# Patient Record
Sex: MALE | Race: WHITE | NOT HISPANIC OR LATINO | URBAN - METROPOLITAN AREA
[De-identification: names, ages, dates, MRNs, and addresses within clinical notes are randomized per-mention and may not be internally consistent; named-entity substitution may affect disease eponyms.]

---

## 2021-01-11 ENCOUNTER — IMMUNIZATIONS (OUTPATIENT)
Dept: FAMILY MEDICINE CLINIC | Facility: HOSPITAL | Age: 51
End: 2021-01-11

## 2021-01-11 DIAGNOSIS — Z23 ENCOUNTER FOR IMMUNIZATION: ICD-10-CM

## 2021-01-11 PROCEDURE — 91301 SARS-COV-2 / COVID-19 MRNA VACCINE (MODERNA) 100 MCG: CPT

## 2021-01-11 PROCEDURE — 0011A SARS-COV-2 / COVID-19 MRNA VACCINE (MODERNA) 100 MCG: CPT

## 2021-02-08 ENCOUNTER — IMMUNIZATIONS (OUTPATIENT)
Dept: FAMILY MEDICINE CLINIC | Facility: HOSPITAL | Age: 51
End: 2021-02-08

## 2021-02-08 DIAGNOSIS — Z23 ENCOUNTER FOR IMMUNIZATION: Primary | ICD-10-CM

## 2021-02-08 PROCEDURE — 91301 SARS-COV-2 / COVID-19 MRNA VACCINE (MODERNA) 100 MCG: CPT

## 2021-02-08 PROCEDURE — 0012A SARS-COV-2 / COVID-19 MRNA VACCINE (MODERNA) 100 MCG: CPT

## 2024-11-21 ENCOUNTER — OFFICE VISIT (OUTPATIENT)
Dept: CARDIOLOGY CLINIC | Facility: CLINIC | Age: 54
End: 2024-11-21
Payer: COMMERCIAL

## 2024-11-21 VITALS
TEMPERATURE: 98.5 F | OXYGEN SATURATION: 98 % | BODY MASS INDEX: 29.43 KG/M2 | HEART RATE: 82 BPM | DIASTOLIC BLOOD PRESSURE: 80 MMHG | HEIGHT: 71 IN | WEIGHT: 210.2 LBS | SYSTOLIC BLOOD PRESSURE: 124 MMHG

## 2024-11-21 DIAGNOSIS — I48.0 PAF (PAROXYSMAL ATRIAL FIBRILLATION) (HCC): Primary | ICD-10-CM

## 2024-11-21 DIAGNOSIS — R06.09 DYSPNEA ON EXERTION: ICD-10-CM

## 2024-11-21 DIAGNOSIS — E78.00 PURE HYPERCHOLESTEROLEMIA: ICD-10-CM

## 2024-11-21 PROCEDURE — 99205 OFFICE O/P NEW HI 60 MIN: CPT | Performed by: INTERNAL MEDICINE

## 2024-11-21 PROCEDURE — 93000 ELECTROCARDIOGRAM COMPLETE: CPT | Performed by: INTERNAL MEDICINE

## 2024-11-21 NOTE — ASSESSMENT & PLAN NOTE
Recent decline in functional capacity and exertional dyspnea while exercising especially while running.  Most likely related to atrial fibrillation.  Plan for echocardiogram, Holter monitor and calcium scoring.

## 2024-11-21 NOTE — ASSESSMENT & PLAN NOTE
Newly diagnosed atrial fibrillation noted today on EKG.  He has had symptoms of exertional dyspnea for the last 6 months to 1 year.  His functional capacity and ability to exercise has gone down over the last 6 months.  He can still do strenuous isometric exercises but not so much running.  He denies palpitations.  He has had no previous cardiac evaluation.  EKG today shows atrial fibrillation with controlled ventricular response.  His [id number] score is 0.  Discussed pathophysiology of atrial fibrillation.  Reviewed stroke risk.  No current indication for anticoagulation therapy.  Plan Holter monitor to assess A-fib burden and rate control with exercise.  2D echo planned to evaluate for underlying structural heart disease or valvular heart disease.  Coronary calcium score requested for risk stratification given family history of unspecified heart disease and personal history of dyslipidemia.  After review of the above test, EP referral advised for consideration of pulmonary vein isolation for definitive treatment of atrial fibrillation.  He is not symptomatic at rest, and his rate is controlled currently, therefore I would defer cardioversion or antiarrhythmic therapy for now.  Discussed lifestyle modification including weight management, avoidance of alcohol intake and continued aerobic exercise as tolerated.  Orders:    POCT ECG    Echo complete w/ contrast if indicated; Future    CT coronary calcium score; Future    Holter monitor; Future    Ambulatory referral to Cardiac Electrophysiology; Future

## 2024-11-21 NOTE — PATIENT INSTRUCTIONS
Echocardiogram planned to evaluate heart function and rule out significant valvular heart disease.  48 hour holter.  EP referral.  Cardiac CT Calcium Score:  A CT calcium score exam, also known as a coronary calcium scan, is a quick, convenient and noninvasive way of evaluating the amount of calcified (hard) plaque in your heart vessels. The level of calcium equates to the extent of plaque build-up in your arteries. Plaque in the arteries can cause heart attacks.    The radiologist reads the images and sends your cardiologist a report with a calcium score. Patients with higher scores have a greater risk for a heart attack, heart disease or stroke. Knowing your score can help us decide on blood pressure and cholesterol goals that will minimize your risk as much as possible.    The American College of Cardiology found that Coronary artery calcification (CAC) is an excellent cardiovascular disease risk marker and can help guide the decision to use cholesterol reducing medications such as statins. A negative calcium score may reduce the need for statins in otherwise eligible patients. Knowing your score could save your life.    The exam takes less than 10 minutes, is painless and does not require any IV or oral contrast. The exam is typically not covered by insurance. The fee at [facility] radiology locations is $99.      Atrial Fibrillation management:  -Continue current cardiac medications.  -Maintain ideal body weight (weight loss helps lower A fib risk if you are overweight).  -Regular walking and increased physical activity is beneficial.  -Eliminate alcohol intake.  -Avoid smoking or recreational drug use.  -Use CPAP if you have sleep apnea.  -Optimal control of  blood pressure and blood sugars.            A-fib (Atrial Fibrillation) or Atrial flutter  WHAT YOU NEED TO KNOW:   A-fib may come and go, or it may be a long-term condition. A-fib can cause blood clots, stroke, or heart failure. These conditions may  become life-threatening. It is important to treat and manage a-fib to help prevent a blood clot, stroke, or heart failure.            Medicines:  You may need any of the following:  Heart medicines  help control your heart rate or rhythm. You may need more than one medicine to treat your symptoms.     Blood thinners: (Eliquis, Xarelto, Pradaxa or Warfarin/coumadin).help prevent blood clots.  Clots can cause strokes, heart attacks, and death. The following are general safety guidelines to follow while you are taking a blood thinner:    Watch for bleeding and bruising while you take blood thinners. Watch for bleeding from your gums or nose. Watch for blood in your urine and bowel movements. Use a soft washcloth on your skin, and a soft toothbrush to brush your teeth. This can keep your skin and gums from bleeding. If you shave, use an electric shaver. Do not play contact sports.     Tell your dentist and other healthcare providers that you take a blood thinner. Wear a bracelet or necklace that says you take this medicine.     Do not start or stop any other medicines unless your healthcare provider tells you to. Many medicines cannot be used with blood thinners.    Take your blood thinner exactly as prescribed by your healthcare provider. Do not skip does or take less than prescribed. Tell your provider right away if you forget to take your blood thinner, or if you take too much.    The following are things you should be aware of if you take warfarin:     Foods and medicines can affect the amount of warfarin in your blood. Do not make major changes to your diet while you take warfarin. Warfarin works best when you eat about the same amount of vitamin K every day. Vitamin K is found in green leafy vegetables and certain other foods. Ask for more information about what to eat when you are taking warfarin.    You will need to see your healthcare provider for follow-up visits when you are on warfarin. You will need regular  blood tests. These tests are used to decide how much medicine you need.      Take your medicine as directed.  Keep a list of the medicines, vitamins, and herbs you take. Include the amounts, and when and why you take them. Bring the list or the pill bottles to follow-up visits. Carry your medicine list with you in case of an emergency.    Manage A-fib:   Know your target heart rate ().  Learn how to check your pulse and monitor your heart rate. Count your pulse for one minute to get an accurate reading.    Avoid alcohol.  Alcohol can make a-fib hard to manage.     Do not smoke.  Nicotine can cause heart damage and make it more difficult to manage your a-fib. Do not use e-cigarettes or smokeless tobacco in place of cigarettes or to help you quit. They still contain nicotine. Ask your healthcare provider for information if you currently smoke and need help quitting.    Eat heart-healthy foods.  Heart healthy foods will help keep your cholesterol low. These include fruits, vegetables, whole-grain breads, low-fat dairy products, beans, lean meats, and fish. Replace butter and margarine with heart-healthy oils such as olive oil and canola oil.     Maintain a healthy weight.  Ask your healthcare provider how much you should weigh. Ask him or her to help you create a safe weight loss plan if you are overweight. Even a small goal of a 10% weight loss can improve your heart health.     Get regular physical activity.  Physical activity helps improve your heart health. Get at least 150 minutes of moderate aerobic physical activity each week. Your healthcare provider can help you create an activity plan.         Manage other health conditions.  This includes high blood pressure or cholesterol, sleep apnea, diabetes, coronary disease, heart failure and kidney disease. Take medicine as directed and follow your treatment plan.      The above information is an  only. It is not intended as medical advice for  individual conditions or treatments. Talk to your doctor, nurse or pharmacist before following any medical regimen to see if it is safe and effective for you.

## 2024-11-21 NOTE — ASSESSMENT & PLAN NOTE
LDL only mildly elevated.  Does not meet criteria for statin therapy.  Calcium score is requested.  Orders:    CT coronary calcium score; Future

## 2024-11-21 NOTE — PROGRESS NOTES
[facility]  [place] [facility] [address]  Phone#  [phone]  Fax#  [phone]  [facility] Cardiology Office Consultation             NAME: Brandon Mckeon  AGE: 54 y.o. SEX: male   : 1970   MRN: [id number]    DATE: 2024  TIME: 4:01 PM    Cardiology Problem list:  Atrial fibrillation:   Dyspnea on exertion  Family history of heart disease: Father, details unknown   Hypercholesterolemia      Assessment & Plan  PAF (paroxysmal atrial fibrillation) (HCC)  Newly diagnosed atrial fibrillation noted today on EKG.  He has had symptoms of exertional dyspnea for the last 6 months to 1 year.  His functional capacity and ability to exercise has gone down over the last 6 months.  He can still do strenuous isometric exercises but not so much running.  He denies palpitations.  He has had no previous cardiac evaluation.  EKG today shows atrial fibrillation with controlled ventricular response.  His [id number]-FPCx score is 0.  Discussed pathophysiology of atrial fibrillation.  Reviewed stroke risk.  No current indication for anticoagulation therapy.  Plan Holter monitor to assess A-fib burden and rate control with exercise.  2D echo planned to evaluate for underlying structural heart disease or valvular heart disease.  Coronary calcium score requested for risk stratification given family history of unspecified heart disease and personal history of dyslipidemia.  After review of the above test, EP referral advised for consideration of pulmonary vein isolation for definitive treatment of atrial fibrillation.  He is not symptomatic at rest, and his rate is controlled currently, therefore I would defer cardioversion or antiarrhythmic therapy for now.  Discussed lifestyle modification including weight management, avoidance of alcohol intake and continued aerobic exercise as tolerated.  Orders:    POCT ECG    Echo complete w/ contrast if indicated; Future    CT coronary calcium  score; Future    Holter monitor; Future    Ambulatory referral to Cardiac Electrophysiology; Future    Pure hypercholesterolemia  LDL only mildly elevated.  Does not meet criteria for statin therapy.  Calcium score is requested.  Orders:    CT coronary calcium score; Future    Dyspnea on exertion  Recent decline in functional capacity and exertional dyspnea while exercising especially while running.  Most likely related to atrial fibrillation.  Plan for echocardiogram, Holter monitor and calcium scoring.              HPI:    Brandon Mckeon is a 54 y.o.-year-old male who presents to the cardiology clinic for initial consultation.    He is here for initial cardiac evaluation in view of family history of heart disease.  His LDL in  was 122.  LDL in  was 124, HDL 50 and triglycerides 102.  At that time LFTs were normal.  Renal function also was normal.  He has had no previous cardiac evaluation.  He is concerned about his CAD risk.  He has a history of very mild dyslipidemia.    He reports he was apparently well till about 6 to 9 months ago and noticed some decline in his functional capacity.  About 6 months ago he was trying to do his regular 8 mile run and suddenly he got short of breath very early into the run.  He could not complete his run.  Over the next few months he again continued to struggle with his running and cut down his exercise program.  He has continued to do isometric exercises in the gym and can lift weights and do strength training without much limitation though he feels he gets tired much easier.  He reports occasional sensation of lightheadedness.  He had 1 very severe episode a few weeks ago.  No syncope reported.  He denies palpitations.  His father had a very unhealthy lifestyle including drug use and  of some complications of heart disease the details of which are unknown.  His mother is alive.  No other family members have atrial fibrillation.    Today's EKG shows atrial  fibrillation with controlled ventricular response.  He currently does not have active primary care provider.    He has no prior history of TIA or stroke.  He is not a diabetic or smoker.  He drinks alcohol only occasionally.      Past history, family history, social history, current medications, vital signs, recent lab and imaging studies and  prior cardiology studies reviewed independently on this visit.    No Known Allergies    Current Outpatient Medications:     Bromelains (BROMELAIN PO), Take by mouth, Disp: , Rfl:     magnesium gluconate (MAGONATE) 500 mg tablet, Take 200 mg by mouth in the morning, Disp: , Rfl:     NATTOKINASE PO, Take by mouth, Disp: , Rfl:     TURMERIC CURCUMIN PO, Take by mouth, Disp: , Rfl:     Brompheniramine-Pseudoeph (BROMALINE PO), Take by mouth (Patient not taking: Reported on 11/21/2024), Disp: , Rfl:     Past Medical History:   Diagnosis Date    Hyperlipidemia      Past Surgical History:   Procedure Laterality Date    FINGER SURGERY       Family History   Problem Relation Age of Onset    No Known Problems Mother     Heart disease Father      Social History   reports that he has never smoked. He has never used smokeless tobacco. He reports current alcohol use. He reports that he does not use drugs. Non smoker  Rare alcohol use    Review of Systems   Constitutional:  Positive for fatigue.   HENT: Negative.     Eyes: Negative.    Respiratory:  Positive for shortness of breath.    Cardiovascular:  Negative for chest pain, palpitations and leg swelling.   Gastrointestinal: Negative.    Endocrine: Negative.    Musculoskeletal: Negative.    Skin: Negative.    Neurological:  Positive for light-headedness. Negative for syncope.   Hematological: Negative.    Psychiatric/Behavioral: Negative.     All other systems reviewed and are negative.      Objective:     Vitals:    11/21/24 1522   BP: 124/80   Pulse: 82   Temp: 98.5 °F (36.9 °C)   SpO2: 98%     Physical Exam  Vitals reviewed.  "  Constitutional:       General: He is not in acute distress.  HENT:      Head: Normocephalic.   Neck:      Vascular: No carotid bruit.   Cardiovascular:      Rate and Rhythm: Normal rate. Rhythm irregularly irregular.      Heart sounds: S1 normal and S2 normal. Murmur heard.      Systolic murmur is present with a grade of 2/6.   Pulmonary:      Breath sounds: No wheezing or rales.   Musculoskeletal:         General: No swelling.      Right lower leg: No edema.      Left lower leg: No edema.   Skin:     General: Skin is warm.   Neurological:      General: No focal deficit present.      Mental Status: He is alert.   Psychiatric:         Mood and Affect: Mood normal.         Pertinent Laboratory/Diagnostic Studies:    Laboratory studies reviewed personally by Usama Montgomery MD    BMP:   Lab Results   Component Value Date    SODIUM 141 11/17/2023    K 4.5 11/17/2023     11/17/2023    CO2 29 11/17/2023    BUN 15 11/17/2023    CREATININE 1.05 11/17/2023    GLUC 91 11/17/2023    CALCIUM 9.4 11/17/2023       Lipid Profile:   Lab Results   Component Value Date    HDL 56 08/15/2014     Lab Results   Component Value Date    LDLCALC 122 (H) 08/15/2014     Lab Results   Component Value Date    TRIG 76 08/15/2014      Other labs:  No results found for: \"FPG9SQNDEROD\", \"TSH\"  Lab Results   Component Value Date    ALT 32 11/17/2023    AST 22 11/17/2023     No results found for: \"HGBA1C\"    Imaging Studies:     Pertinent cardiac studies and imaging studies were personally reviewed on this visit and results summarized.    I have spent a total time of 62  minutes in caring for this patient on the day of the visit/encounter including reviewing Diagnostic results, Instructions for management, Patient education, Risk factor reductions, Impressions, Documenting in the medical record, Reviewing / ordering tests, medicine, procedures  , and Obtaining or reviewing history  .    Portions of the record may have been created with voice " "recognition software.  Occasional wrong word or \"sound alike\" substitutions may have occurred due to the inherent limitations of voice recognition software.  Read the chart carefully and recognize, using context, where substitutions have occurred. Please reach out to me directly for any clarifications.  "

## 2024-12-03 ENCOUNTER — HOSPITAL ENCOUNTER (OUTPATIENT)
Dept: NON INVASIVE DIAGNOSTICS | Facility: HOSPITAL | Age: 54
Discharge: HOME | End: 2024-12-03
Attending: INTERNAL MEDICINE
Payer: COMMERCIAL

## 2024-12-03 DIAGNOSIS — I48.0 PAF (PAROXYSMAL ATRIAL FIBRILLATION) (HCC): ICD-10-CM

## 2024-12-03 PROCEDURE — 93225 XTRNL ECG REC<48 HRS REC: CPT

## 2024-12-03 PROCEDURE — 93226 XTRNL ECG REC<48 HR SCAN A/R: CPT

## 2024-12-06 ENCOUNTER — RESULTS FOLLOW-UP (OUTPATIENT)
Dept: CARDIOLOGY CLINIC | Facility: CLINIC | Age: 54
End: 2024-12-06

## 2024-12-06 PROCEDURE — 93227 XTRNL ECG REC<48 HR R&I: CPT | Performed by: INTERNAL MEDICINE

## 2024-12-06 NOTE — RESULT ENCOUNTER NOTE
Cardiac monitor shows that overall heart rhythm is irregular related to atrial fibrillation.  Average heart rate is : 82. This signifies good control.  However the heart rate did vary from  bpm which is typically seen with atrial fibrillation.  You already have a scheduled follow-up with Dr. Brown next week.  Please keep your appointment to discuss ablation options.    Please call patient with test results.

## 2024-12-09 NOTE — PROGRESS NOTES
Consultation - Electrophysiology-Cardiology (EP)   Brandon Mckeon 54 y.o. male MRN: [id number]  Unit/Bed#:  Encounter: [id number]      1. PAF (paroxysmal atrial fibrillation) (HCC)  Ambulatory referral to Cardiac Electrophysiology    POCT ECG      2. Dyspnea on exertion        3. Mixed hyperlipidemia                Consults  Physician Requesting Consult: Usama Montgomery MD   Reason for Consult / Principal Problem: PAF & discuss ablation      Summary of my recommendation for the patient  Patient has history of documented A-fib since November 2024  He is extremely athletic-does [employer], runs 5 miles with his children, significant amount of calisthenics  Noted a sudden decline in activity for at least 6 months to 12 months    Heart rate is in the 70s, [id number] score is 0  Does not have any of the usual risk factors which predispose to A-fib    He is going to be best served with early ablation  Pulsed field, comprehensive PVI and posterior wall    Risks and benefits discussed with the patient  There is a small risk of bleeding of the groin, bleeding around the heart, heart attack, stroke, injury to surrounding structures    Please make sure that the transthoracic echo is done/major abnormalities ruled out before even setting up for the procedures  CT pulmonary vein  CARLOS ENRIQUE prior to the procedure  Start Eliquis at least 1 to 2 weeks before the procedure -hold Eliquis the morning of the procedure and to continue it for 3 months postprocedure  NavX              Clinical conditions  Atrial fibrillation-persistent  Exertional dyspnea for 6 months to a year  [id number] score is 0  Mixed hyperlipidemia            Assessment & Plan     Atrial fibrillation-persistent  Exertional dyspnea for 6 months to a year  [id number] score is 0  Mixed hyperlipidemia    Patient has history of documented A-fib since November 2024  He is extremely athletic-does [employer], runs 5 miles with his children, significant amount of  alem  Noted a sudden decline in activity for at least 6 months to 12 months    Heart rate is in the 70s, [id number] score is 0  Does not have any of the usual risk factors which predispose to A-fib    He is going to be best served with early ablation  Pulsed field, comprehensive PVI and posterior wall    Risks and benefits discussed with the patient  There is a small risk of bleeding of the groin, bleeding around the heart, heart attack, stroke, injury to surrounding structures    Please make sure that the transthoracic echo is done/major abnormalities ruled out before even setting up for the procedures  CT pulmonary vein  CARLOS ENRIQUE prior to the procedure  Start Eliquis at least 1 to 2 weeks before the procedure -hold Eliquis the morning of the procedure and to continue it for 3 months postprocedure  NavX                History of Present Illness   HPI: Brandon Mckeon is a 54 y.o. year old male has been referred to me by Dr Montgomery  For evaluation and management of AF    The patient has significant medical illnesses which include  Persistent A fib   Exertional dyspnea     Patient has history of documented A-fib since 2024  He is extremely athletic-does [employer], runs 5 miles with his children, significant amount of calisthenics  Noted a sudden decline in activity for at least 6 months to 12 months    He is not complaining of anginal-like chest pain, orthopnea, PND, leg swelling  he is not complaining of significant palpitation, presyncope or syncope  He does feel exertional intolerance    He does not abuse tobacco alcohol or energy drinks    His father has history of drug abuse and  at the age of 52    Historical Information   Past Medical History:   Diagnosis Date    Hyperlipidemia      Past Surgical History:   Procedure Laterality Date    FINGER SURGERY       Social History     Substance and Sexual Activity   Alcohol Use Yes     Social History     Substance and Sexual Activity   Drug Use Never  "    Social History     Tobacco Use   Smoking Status Never   Smokeless Tobacco Never     Social History     Socioeconomic History    Marital status: /Civil Union     Spouse name: Not on file    Number of children: Not on file    Years of education: Not on file    Highest education level: Not on file   Occupational History    Not on file   Tobacco Use    Smoking status: Never    Smokeless tobacco: Never   Vaping Use    Vaping status: Never Used   Substance and Sexual Activity    Alcohol use: Yes    Drug use: Never    Sexual activity: Not on file   Other Topics Concern    Not on file   Social History Narrative    Not on file     Social Drivers of Health     Financial Resource Strain: Not on file   Food Insecurity: Not on file   Transportation Needs: Not on file   Physical Activity: Not on file   Stress: Not on file   Social Connections: Not on file   Intimate Partner Violence: Not on file   Housing Stability: Not on file     .  Family History:  Family History   Problem Relation Age of Onset    No Known Problems Mother     Heart disease Father          Meds/Allergies    No current facility-administered medications for this visit.     Not in a hospital admission.    No Known Allergies        Objective   Vitals: Visit Vitals  /86 (BP Location: Right arm, Patient Position: Sitting, Cuff Size: Large)   Pulse 73   Ht 5' 11\" (1.803 m)   Wt 95.5 kg (210 lb 8 oz)   BMI 29.36 kg/m²   Smoking Status Never   BSA 2.16 m²      Vitals:    12/11/24 1326   Weight: 95.5 kg (210 lb 8 oz)   [unfilled]    Invasive Devices       None                     ROS  Review of Systems   All other systems reviewed and are negative.  As described in my history of present illness         PHYSICAL EXAM  Physical Exam  Vitals reviewed.   Constitutional:       General: He is not in acute distress.     Appearance: Normal appearance. He is normal weight. He is not ill-appearing.   HENT:      Head: Normocephalic and atraumatic.      Right Ear: " "External ear normal.      Left Ear: External ear normal.      Nose: Nose normal.      Mouth/Throat:      Pharynx: Oropharynx is clear.   Eyes:      General: No scleral icterus.     Extraocular Movements: Extraocular movements intact.      Conjunctiva/sclera: Conjunctivae normal.      Pupils: Pupils are equal, round, and reactive to light.   Cardiovascular:      Rate and Rhythm: Normal rate. Rhythm irregular.      Heart sounds: Normal heart sounds. No murmur heard.     No gallop.   Pulmonary:      Effort: No respiratory distress.      Breath sounds: Normal breath sounds. No wheezing.   Abdominal:      General: Bowel sounds are normal. There is no distension.      Palpations: Abdomen is soft.      Tenderness: There is no abdominal tenderness. There is no guarding.   Musculoskeletal:         General: No swelling or deformity.      Cervical back: Neck supple. No rigidity.   Skin:     Coloration: Skin is not jaundiced.      Findings: No bruising or lesion.   Neurological:      Mental Status: He is alert and oriented to person, place, and time. Mental status is at baseline.      Motor: No weakness.   Psychiatric:         Mood and Affect: Mood normal.         Behavior: Behavior normal.         Thought Content: Thought content normal.         Judgment: Judgment normal.               LAB RESULTS:    CBC:  No results found for: \"WBC\", \"HGB\", \"HCT\", \"MCV\", \"PLT\", \"ADJUSTEDWBC\", \"RBC\", \"MCH\", \"MCHC\", \"RDW\", \"MPV\", \"NRBC\"    CMP:  Sodium   Date Value Ref Range Status   08/15/2014 137 136 - 145 mmol/L Final     Potassium   Date Value Ref Range Status   11/17/2023 4.5 3.5 - 5.2 mmol/L Final     Chloride   Date Value Ref Range Status   11/17/2023 104 100 - 109 mmol/L Final     Carbon Dioxide   Date Value Ref Range Status   11/17/2023 29 21 - 31 mmol/L Final     Anion Gap   Date Value Ref Range Status   08/15/2014 3 (L) 4 - 13 mmol/L Final     BUN   Date Value Ref Range Status   11/17/2023 15 7 - 28 mg/dL Final     Creatinine   Date " "Value Ref Range Status   11/17/2023 1.05 0.53 - 1.30 mg/dL Final     Glucose   Date Value Ref Range Status   08/15/2014 98 65 - 140 mg/dL Final     Comment:     If patient is fasting, the ADA then defines impaired fasting glucose as  >100 mg/dl and diabetes as  >or equal to 126 mg/dl.       Calcium   Date Value Ref Range Status   11/17/2023 9.4 8.5 - 10.1 mg/dL Final     AST   Date Value Ref Range Status   11/17/2023 22 <41 U/L Final     ALT   Date Value Ref Range Status   11/17/2023 32 <56 U/L Final     Alkaline Phosphatase   Date Value Ref Range Status   11/17/2023 50 35 - 120 U/L Final     Total Protein   Date Value Ref Range Status   08/15/2014 7.4 6.4 - 8.2 g/dL Final     Total Bilirubin   Date Value Ref Range Status   08/15/2014 0.81 0.20 - 1.00 mg/dL Final     GFR, Calculated   Date Value Ref Range Status   03/05/2020 91 >60 mL/min/1.73m2 Final     Comment:     mL/min per 1.73 square meters                                            Normal Function or Mild Renal    Disease (if clinically at risk):  >or=60  Moderately Decreased:                30-59  Severely Decreased:                  15-29  Renal Failure:                         <15                                            -American GFR: multiply reported GFR by 1.16    Please note that the eGFR is based on the CKD-EPI calculation, and is not intended to be used for drug dosing.                                            Note: Calculated GFR may not be an accurate indicator of renal function if the patient's renal function is not in a steady state.    Ordering Provider: PIETRO MORENO     eGFRcr   Date Value Ref Range Status   11/17/2023 84 >59 Final        Magnesium:   No results found for: \"MG\", \"MAGNESIUM\"     A1C:  No results found for: \"HGBA1C\"     TSH:  No results found for: \"TSH\", \"[id number]\", \"TSHREFLEX\"     PT/INR:  No results found for: \"PROTIME\", \"INR\"    Lipid Panel:  Triglycerides   Date Value Ref Range Status   08/15/2014 76 mg/dL " "Final     Comment:     TRIGLYCERIDE:       Normal                 <150 mg/dl       Borderline High       150-199 mg/dl       High                  200-499 mg/dl       Very High             >499 mg/dl  _______________________________________       HDL   Date Value Ref Range Status   08/15/2014 56 mg/dL Final     Comment:     HDL:       High       >59 mg/dl       Low        <41 mg/dl  ______________________________         Troponin:  No results found for: \"TROPONINI\"      Imaging:    [id number] - Atrial fibrillation         CARLOS ENRIQUE:  No results found for this or any previous visit.      Echocardiogram:  No results found for this or any previous visit.      Stress Test:   No results found for this or any previous visit.      Cardiac Catheterization:  No results found for this or any previous visit.      HOLTER MONITOR: 24 HOUR/48 HOUR MONITORS  Results for orders placed during the hospital encounter of 24    Holter monitor    Interpretation Summary  PT NAME: Brandon Mckeon  : 1970  AGE: 54 y.o.  GENDER: male  MRN: [id number]   PROCEDURE: Holter monitor        INDICATIONS: Palpitations      FINDINGS:    Holter monitoring revealed predominant atrial fibrillation with an average heart rate of 82 BPM, a minimum heart rate of 41 BPM, and a maximum heart rate of 214 BPM.    There were about 395 ventricular ectopic beats, mostly occurring as single PVC's with no evidence of ventricular tachycardia.    There was no evidence of supraventricular arrhythmias other than the patient's persistent atrial fibrillation noted throughout the study.  There is no evidence of atrial flutter.    There was no evidence of significant bradyarrhythmia or advanced heart block.  The longest R-R interval was 2.8 seconds.    The patient's symptom diary reported no symptoms.      AMB Extended Holter Monitor: Zio XT/AT or BioTel  No results found for this or any previous visit.        DEVICE CHECK:     No results found for this or any " previous visit.           Code Status: [unfilled]  Advance Directive and Living Will:      Power of :    POLST:      Counseling / Coordination of Care    Discussed persistent AF and need to proceed with ablation , risks and benefits     Lucien Borwn MD

## 2024-12-09 NOTE — RESULT ENCOUNTER NOTE
Called pt with holter monitor results. Pt understood. Informed pt to keep his appt with Dr. Brown in a couple days. Pt understood.

## 2024-12-11 ENCOUNTER — OFFICE VISIT (OUTPATIENT)
Dept: CARDIOLOGY CLINIC | Facility: CLINIC | Age: 54
End: 2024-12-11

## 2024-12-11 VITALS
SYSTOLIC BLOOD PRESSURE: 136 MMHG | WEIGHT: 210.5 LBS | DIASTOLIC BLOOD PRESSURE: 86 MMHG | HEART RATE: 73 BPM | HEIGHT: 71 IN | BODY MASS INDEX: 29.47 KG/M2

## 2024-12-11 DIAGNOSIS — I48.0 PAF (PAROXYSMAL ATRIAL FIBRILLATION) (HCC): Primary | ICD-10-CM

## 2024-12-11 DIAGNOSIS — R06.09 DYSPNEA ON EXERTION: ICD-10-CM

## 2024-12-11 DIAGNOSIS — E78.2 MIXED HYPERLIPIDEMIA: ICD-10-CM

## 2024-12-11 NOTE — LETTER
December 11, 2024     Usama Montgomery MD  [address] [place] [id number]    Patient: Brandon Mckeon   YOB: 1970   Date of Visit: 12/11/2024       Dear Dr. Montgomery:    Thank you for referring Brandon Mckeon to me for evaluation. Below are my notes for this consultation.    If you have questions, please do not hesitate to call me. I look forward to following your patient along with you.         Sincerely,        Lucien Brown MD        CC: Brandon Mike  CARDIOLOGY SURGERY COORDINATOR    Lucien Brown MD  12/11/2024  2:56 PM  Sign when Signing Visit   Consultation - Electrophysiology-Cardiology (EP)   Brandon Mckeon 54 y.o. male MRN: [id number]  Unit/Bed#:  Encounter: [id number]      1. PAF (paroxysmal atrial fibrillation) (HCC)  Ambulatory referral to Cardiac Electrophysiology    POCT ECG      2. Dyspnea on exertion        3. Mixed hyperlipidemia                Consults  Physician Requesting Consult: Usama Montgomery MD   Reason for Consult / Principal Problem: PAF & discuss ablation      Summary of my recommendation for the patient  Patient has history of documented A-fib since November 2024  He is extremely athletic-does [employer], runs 5 miles with his children, significant amount of calisthenics  Noted a sudden decline in activity for at least 6 months to 12 months    Heart rate is in the 70s, [id number]-[id number] score is 0  Does not have any of the usual risk factors which predispose to A-fib    He is going to be best served with early ablation  Pulsed field, comprehensive PVI and posterior wall    Risks and benefits discussed with the patient  There is a small risk of bleeding of the groin, bleeding around the heart, heart attack, stroke, injury to surrounding structures    Please make sure that the transthoracic echo is done/major abnormalities ruled out before even setting up for the procedures  CT pulmonary vein  CARLOS ENRIQUE prior to the procedure  Start Eliquis at least 1 to 2 weeks before the procedure -hold  Eliquis the morning of the procedure and to continue it for 3 months postprocedure  NavX              Clinical conditions  Atrial fibrillation-persistent  Exertional dyspnea for 6 months to a year  [id number] score is 0  Mixed hyperlipidemia            Assessment & Plan    Atrial fibrillation-persistent  Exertional dyspnea for 6 months to a year  [id number] score is 0  Mixed hyperlipidemia    Patient has history of documented A-fib since November 2024  He is extremely athletic-does [employer], runs 5 miles with his children, significant amount of calisthenics  Noted a sudden decline in activity for at least 6 months to 12 months    Heart rate is in the 70s, [id number] score is 0  Does not have any of the usual risk factors which predispose to A-fib    He is going to be best served with early ablation  Pulsed field, comprehensive PVI and posterior wall    Risks and benefits discussed with the patient  There is a small risk of bleeding of the groin, bleeding around the heart, heart attack, stroke, injury to surrounding structures    Please make sure that the transthoracic echo is done/major abnormalities ruled out before even setting up for the procedures  CT pulmonary vein  CARLOS ENRIQUE prior to the procedure  Start Eliquis at least 1 to 2 weeks before the procedure -hold Eliquis the morning of the procedure and to continue it for 3 months postprocedure  NavX                History of Present Illness  HPI: Brandon Mckeon is a 54 y.o. year old male has been referred to me by Dr Montgomery  For evaluation and management of AF    The patient has significant medical illnesses which include  Persistent A fib   Exertional dyspnea     Patient has history of documented A-fib since November 2024  He is extremely athletic-does [employer], runs 5 miles with his children, significant amount of calisthenics  Noted a sudden decline in activity for at least 6 months to 12 months    He is not complaining of anginal-like chest pain, orthopnea, PND,  "leg swelling  he is not complaining of significant palpitation, presyncope or syncope  He does feel exertional intolerance    He does not abuse tobacco alcohol or energy drinks    His father has history of drug abuse and  at the age of 52    Historical Information  Past Medical History:   Diagnosis Date   • Hyperlipidemia      Past Surgical History:   Procedure Laterality Date   • FINGER SURGERY       Social History     Substance and Sexual Activity   Alcohol Use Yes     Social History     Substance and Sexual Activity   Drug Use Never     Social History     Tobacco Use   Smoking Status Never   Smokeless Tobacco Never     Social History     Socioeconomic History   • Marital status: /Civil Union     Spouse name: Not on file   • Number of children: Not on file   • Years of education: Not on file   • Highest education level: Not on file   Occupational History   • Not on file   Tobacco Use   • Smoking status: Never   • Smokeless tobacco: Never   Vaping Use   • Vaping status: Never Used   Substance and Sexual Activity   • Alcohol use: Yes   • Drug use: Never   • Sexual activity: Not on file   Other Topics Concern   • Not on file   Social History Narrative   • Not on file     Social Drivers of Health     Financial Resource Strain: Not on file   Food Insecurity: Not on file   Transportation Needs: Not on file   Physical Activity: Not on file   Stress: Not on file   Social Connections: Not on file   Intimate Partner Violence: Not on file   Housing Stability: Not on file     .  Family History:  Family History   Problem Relation Age of Onset   • No Known Problems Mother    • Heart disease Father          Meds/Allergies   No current facility-administered medications for this visit.     Not in a hospital admission.    No Known Allergies        Objective  Vitals: Visit Vitals  /86 (BP Location: Right arm, Patient Position: Sitting, Cuff Size: Large)   Pulse 73   Ht 5' 11\" (1.803 m)   Wt 95.5 kg (210 lb 8 oz) " "  BMI 29.36 kg/m²   Smoking Status Never   BSA 2.16 m²      Vitals:    12/11/24 1326   Weight: 95.5 kg (210 lb 8 oz)   [unfilled]    Invasive Devices       None                     ROS  Review of Systems   All other systems reviewed and are negative.  As described in my history of present illness         PHYSICAL EXAM  Physical Exam  Vitals reviewed.   Constitutional:       General: He is not in acute distress.     Appearance: Normal appearance. He is normal weight. He is not ill-appearing.   HENT:      Head: Normocephalic and atraumatic.      Right Ear: External ear normal.      Left Ear: External ear normal.      Nose: Nose normal.      Mouth/Throat:      Pharynx: Oropharynx is clear.   Eyes:      General: No scleral icterus.     Extraocular Movements: Extraocular movements intact.      Conjunctiva/sclera: Conjunctivae normal.      Pupils: Pupils are equal, round, and reactive to light.   Cardiovascular:      Rate and Rhythm: Normal rate. Rhythm irregular.      Heart sounds: Normal heart sounds. No murmur heard.     No gallop.   Pulmonary:      Effort: No respiratory distress.      Breath sounds: Normal breath sounds. No wheezing.   Abdominal:      General: Bowel sounds are normal. There is no distension.      Palpations: Abdomen is soft.      Tenderness: There is no abdominal tenderness. There is no guarding.   Musculoskeletal:         General: No swelling or deformity.      Cervical back: Neck supple. No rigidity.   Skin:     Coloration: Skin is not jaundiced.      Findings: No bruising or lesion.   Neurological:      Mental Status: He is alert and oriented to person, place, and time. Mental status is at baseline.      Motor: No weakness.   Psychiatric:         Mood and Affect: Mood normal.         Behavior: Behavior normal.         Thought Content: Thought content normal.         Judgment: Judgment normal.               LAB RESULTS:    CBC:  No results found for: \"WBC\", \"HGB\", \"HCT\", \"MCV\", \"PLT\", \"ADJUSTEDWBC\", " "\"RBC\", \"MCH\", \"MCHC\", \"RDW\", \"MPV\", \"NRBC\"    CMP:  Sodium   Date Value Ref Range Status   08/15/2014 137 136 - 145 mmol/L Final     Potassium   Date Value Ref Range Status   11/17/2023 4.5 3.5 - 5.2 mmol/L Final     Chloride   Date Value Ref Range Status   11/17/2023 104 100 - 109 mmol/L Final     Carbon Dioxide   Date Value Ref Range Status   11/17/2023 29 21 - 31 mmol/L Final     Anion Gap   Date Value Ref Range Status   08/15/2014 3 (L) 4 - 13 mmol/L Final     BUN   Date Value Ref Range Status   11/17/2023 15 7 - 28 mg/dL Final     Creatinine   Date Value Ref Range Status   11/17/2023 1.05 0.53 - 1.30 mg/dL Final     Glucose   Date Value Ref Range Status   08/15/2014 98 65 - 140 mg/dL Final     Comment:     If patient is fasting, the ADA then defines impaired fasting glucose as  >100 mg/dl and diabetes as  >or equal to 126 mg/dl.       Calcium   Date Value Ref Range Status   11/17/2023 9.4 8.5 - 10.1 mg/dL Final     AST   Date Value Ref Range Status   11/17/2023 22 <41 U/L Final     ALT   Date Value Ref Range Status   11/17/2023 32 <56 U/L Final     Alkaline Phosphatase   Date Value Ref Range Status   11/17/2023 50 35 - 120 U/L Final     Total Protein   Date Value Ref Range Status   08/15/2014 7.4 6.4 - 8.2 g/dL Final     Total Bilirubin   Date Value Ref Range Status   08/15/2014 0.81 0.20 - 1.00 mg/dL Final     GFR, Calculated   Date Value Ref Range Status   03/05/2020 91 >60 mL/min/1.73m2 Final     Comment:     mL/min per 1.73 square meters                                            Normal Function or Mild Renal    Disease (if clinically at risk):  >or=60  Moderately Decreased:                30-59  Severely Decreased:                  15-29  Renal Failure:                         <15                                            -American GFR: multiply reported GFR by 1.16    Please note that the eGFR is based on the CKD-EPI calculation, and is not intended to be used for drug dosing.                      " "                      Note: Calculated GFR may not be an accurate indicator of renal function if the patient's renal function is not in a steady state.    Ordering Provider: PIETRO MORENO     eGFRcr   Date Value Ref Range Status   2023 84 >59 Final        Magnesium:   No results found for: \"MG\", \"MAGNESIUM\"     A1C:  No results found for: \"HGBA1C\"     TSH:  No results found for: \"TSH\", \"[id number]\", \"TSHREFLEX\"     PT/INR:  No results found for: \"PROTIME\", \"INR\"    Lipid Panel:  Triglycerides   Date Value Ref Range Status   08/15/2014 76 mg/dL Final     Comment:     TRIGLYCERIDE:       Normal                 <150 mg/dl       Borderline High       150-199 mg/dl       High                  200-499 mg/dl       Very High             >499 mg/dl  _______________________________________       HDL   Date Value Ref Range Status   08/15/2014 56 mg/dL Final     Comment:     HDL:       High       >59 mg/dl       Low        <41 mg/dl  ______________________________         Troponin:  No results found for: \"TROPONINI\"      Imaging:    [id number] - Atrial fibrillation         CARLOS ENRIQUE:  No results found for this or any previous visit.      Echocardiogram:  No results found for this or any previous visit.      Stress Test:   No results found for this or any previous visit.      Cardiac Catheterization:  No results found for this or any previous visit.      HOLTER MONITOR: 24 HOUR/48 HOUR MONITORS  Results for orders placed during the hospital encounter of 24    Holter monitor    Interpretation Summary  PT NAME: Brandon Mckeon  : 1970  AGE: 54 y.o.  GENDER: male  MRN: [id number]   PROCEDURE: Holter monitor        INDICATIONS: Palpitations      FINDINGS:    Holter monitoring revealed predominant atrial fibrillation with an average heart rate of 82 BPM, a minimum heart rate of 41 BPM, and a maximum heart rate of 214 BPM.    There were about 395 ventricular ectopic beats, mostly occurring as single PVC's with " no evidence of ventricular tachycardia.    There was no evidence of supraventricular arrhythmias other than the patient's persistent atrial fibrillation noted throughout the study.  There is no evidence of atrial flutter.    There was no evidence of significant bradyarrhythmia or advanced heart block.  The longest R-R interval was 2.8 seconds.    The patient's symptom diary reported no symptoms.      AMB Extended Holter Monitor: Zio XT/AT or BioTel  No results found for this or any previous visit.        DEVICE CHECK:     No results found for this or any previous visit.           Code Status: [unfilled]  Advance Directive and Living Will:      Power of :    POLST:      Counseling / Coordination of Care    Discussed persistent AF and need to proceed with ablation , risks and benefits     Lucien Brown MD

## 2024-12-12 ENCOUNTER — PREP FOR PROCEDURE (OUTPATIENT)
Dept: CARDIOLOGY CLINIC | Facility: CLINIC | Age: 54
End: 2024-12-12

## 2024-12-12 ENCOUNTER — TELEPHONE (OUTPATIENT)
Age: 54
End: 2024-12-12

## 2024-12-12 DIAGNOSIS — I48.0 PAF (PAROXYSMAL ATRIAL FIBRILLATION) (HCC): Primary | ICD-10-CM

## 2024-12-12 NOTE — TELEPHONE ENCOUNTER
"Patient scheduled for CARLOS ENRIQUE/AFIB PFA PVI/PW Ablation on 2/12/25 at [facility] with Dr. Brown.      Instructions sent to patient through Task Messenger.      Patient aware of all general instructions.    Medication Starts:   N/A    Blood Thinners:  Eliquis - Start using one week before the procedure.     Eliquis - Do not take morning of procedure.    Labs to be done on 1/29/25:  CMP / CBC (FASTING 8 HOURS)    CT  PV order placed on separate encounter.       Thank you,  Becki \"Anamika\" Nils    "

## 2024-12-17 ENCOUNTER — TELEPHONE (OUTPATIENT)
Age: 54
End: 2024-12-17

## 2024-12-17 DIAGNOSIS — I48.0 PAF (PAROXYSMAL ATRIAL FIBRILLATION) (HCC): Primary | ICD-10-CM

## 2024-12-17 NOTE — TELEPHONE ENCOUNTER
"I called patient regarding message below.     Patient states he's frustrated with [facility] because he went the other day to return a holter monitor to the same location it was put on at [place] and now they are telling him that he was a no show to that appt and never dropped off the holter monitor and he's afraid he'll be responsible to pay for that holter monitor. Patient states they can look at the cameras to see that he was there. Today he want to get labs for his 7AM appt and the tech was late to open the clinic lab and there were no lab orders for him when he had already talked with someone to confirm labs on chart. (The labs on there are for his ablation, that's not the one he's referring too). Patient states he lost the confidence and is afraid something can go wrong now with regards to his procedure and said he will go somewhere else.     I hang up call with patient and reviewed chart a little more. I did find that the holter monitor results was received and sent to patient.     I called patient again and informed him that the holter monitor was received so he does not need to be worried about being responsible to pay for that monitor and that results were sent to him thru MyChart by  and shows he has not read results. I read results to patient. Patient states he's been checking his MyChart and did not see those results and appreciated that I called him to let him know.    I did ask again if he still wanted to cancel his ablation and patient responded yes and that he will go somewhere else.     Fwd to  and PA as FYI.     CX CARLOS ENRIQUE/AFIB FPA ABL PVI/PW on 2/12/25 w/ @ [facility] due to patient will go somewhere else.    Thanks,  Becki \"Anamika\" Nils Soni at 12/17/2024  8:37 AM    Status: Signed   Caller: Brandon Mckeon     Doctor: Dr. Brown     Call back #: [phone]     Reason for call: Patient requested to cancel his ablation surgery with Dr. Brown on 2/12/25.         "

## 2024-12-17 NOTE — TELEPHONE ENCOUNTER
Caller: Brandon Mckeon    Doctor: Dr. Brown    Call back #: [phone]    Reason for call: Patient requested to cancel his ablation surgery with Dr. Brown on 2/12/25.

## 2024-12-20 ENCOUNTER — TELEPHONE (OUTPATIENT)
Age: 54
End: 2024-12-20

## 2024-12-20 NOTE — TELEPHONE ENCOUNTER
Caller: Mariela @ [facility]    Doctor: Dr. Montgomery    Call back #: [phone] ext [phone]    Fax #: [phone]    Reason for call: Mariela with [facility] called to request patient's cardiology records. Patient is transferring over his care to [facility] and needs this completed. If needed, Mariela's direct number and extension is listed above as well as fax number.

## 2025-06-26 ENCOUNTER — APPOINTMENT (OUTPATIENT)
Dept: URGENT CARE | Age: 55
End: 2025-06-26